# Patient Record
Sex: MALE | Race: WHITE | URBAN - METROPOLITAN AREA
[De-identification: names, ages, dates, MRNs, and addresses within clinical notes are randomized per-mention and may not be internally consistent; named-entity substitution may affect disease eponyms.]

---

## 2017-01-09 ENCOUNTER — OFFICE VISIT (OUTPATIENT)
Dept: SLEEP MEDICINE | Facility: CLINIC | Age: 52
End: 2017-01-09
Attending: INTERNAL MEDICINE
Payer: COMMERCIAL

## 2017-01-09 VITALS
OXYGEN SATURATION: 95 % | WEIGHT: 200 LBS | BODY MASS INDEX: 27.09 KG/M2 | HEART RATE: 70 BPM | HEIGHT: 72 IN | SYSTOLIC BLOOD PRESSURE: 136 MMHG | DIASTOLIC BLOOD PRESSURE: 80 MMHG

## 2017-01-09 DIAGNOSIS — G47.33 OBSTRUCTIVE SLEEP APNEA: Primary | ICD-10-CM

## 2017-01-09 PROCEDURE — 99211 OFF/OP EST MAY X REQ PHY/QHP: CPT | Mod: ZF

## 2017-01-09 NOTE — NURSING NOTE
Chief Complaint   Patient presents with     Consult     Inspire implant     Consult       Initial /80 mmHg  Pulse 70  Ht 1.829 m (6')  Wt 90.719 kg (200 lb)  BMI 27.12 kg/m2  SpO2 95% Estimated body mass index is 27.12 kg/(m^2) as calculated from the following:    Height as of this encounter: 1.829 m (6').    Weight as of this encounter: 90.719 kg (200 lb).  BP completed using cuff size: large  Right arm  Neck 42cm    MRoldan CMA

## 2017-01-10 NOTE — PROGRESS NOTES
Mr. Michael Garza is a 51-year-old gentleman with previously diagnosed severe obstructive sleep apnea based on a sleep study performed at [facility] with an apnea/hypopnea index of 55 and a prolonged period of CPAP intolerance.  He notes his sleep apnea associated with rather significant supine worsening.      As a result of CPAP intolerance he underwent nerve stimulator implant on  for hypoglossal nerve stimulation therapy.  The device was turned on today and we have counseled him on use of this device, advancement of the voltage and potential complications with tongue discomfort.  He will return in 1 month for final titration protocol using the device.      Assessment:  Severe obstructive sleep apnea with hypoxemia  Status-post right hypoglossal neurostimulator implant [UAS] 16    Plan:  UAS activated today with threshold for movement at 0.8 volts and adequate tongue protrusion at 0.9v  Voltage range set at 0.9 - 1.9 v  Increase voltage by 0.1 volt every day  30 minute delay  15 pause time  Use every day  Return  for final titration study    Total time 25 minutes, greater than 50% counseling.         BENEDICTO LESTER MD             D: 2017 16:03   T: 2017 20:38   MT: CT      Name:     MICHAEL GARZA   MRN:      [id number]        Account:      [id number]   :      1965           Visit Date:   2017      Document: [id number]       cc: Vivian Mills MD

## 2017-02-13 ENCOUNTER — THERAPY VISIT (OUTPATIENT)
Dept: SLEEP MEDICINE | Facility: CLINIC | Age: 52
End: 2017-02-13
Attending: INTERNAL MEDICINE
Payer: COMMERCIAL

## 2017-02-13 DIAGNOSIS — G47.33 OBSTRUCTIVE SLEEP APNEA: ICD-10-CM

## 2017-02-13 PROCEDURE — 95811 POLYSOM 6/>YRS CPAP 4/> PARM: CPT | Mod: ZF

## 2017-02-13 NOTE — MR AVS SNAPSHOT
After Visit Summary   2/13/2017    Pawel Degroot    MRN: [id number]           Patient Information     Date Of Birth          1965        Visit Information        Provider Department      2/13/2017 8:00 PM SLEEP STUDY RM 5 [facility], [place], Sleep Study        Care Instructions    [facility]    1. Your sleep study will be reviewed by a sleep physician within the next few days.     2. Please follow up in the sleep clinic as scheduled, or, make an appointment with your sleep provider to be seen within two weeks to discuss the results of the sleep study.    3. If you have any questions or problems with your treatment plan, please contact your sleep clinic provider at [phone] to further manage your condition.    4. Please review your attached medication list, and, at your follow-up appointment advise your sleep clinic provider about any changes.    5. Go to [email or web address] for more information about your sleep problems.    KAYE Brandon  February 14, 2017                Follow-ups after your visit        Your next 10 appointments already scheduled     Mar 23, 2017  8:30 AM CDT   (Arrive by 8:15 AM)   RETURN SLEEP ENT with Anna Lemon MD   [facility] ([facility] [[email or web address]] [facility] [place])    [address] [place] [id number] [phone]              Who to contact     If you have questions or need follow up information about today's clinic visit or your schedule please [facility] directly at [phone].  Normal or non-critical lab and imaging results will be communicated to you by MyChart, letter or phone within 4 business days after the clinic has received the results. If you do not hear from us within 7 days, please contact the clinic through MyChart or phone. If you have a critical or abnormal lab result, we will notify you by phone as soon as  "possible.  Submit refill requests through [employer] or call your pharmacy and they will forward the refill request to us. Please allow 3 business days for your refill to be completed.          Additional Information About Your Visit        [employer] Information     [employer] lets you send messages to your doctor, view your test results, renew your prescriptions, schedule appointments and more. To sign up, go to [email or web address] . Click on \"Log in\" on the left side of the screen, which will take you to the Welcome page. Then click on \"Sign up Now\" on the right side of the page.     You will be asked to enter the access code listed below, as well as some personal information. Please follow the directions to create your username and password.     Your access code is: [email or web address]  Expires: 3/6/2017  6:31 AM     Your access code will  in 90 days. If you need help or a new code, please call your [place] clinic or [phone].        Care EveryWhere ID     This is your Care EveryWhere ID. This could be used by other organizations to access your [place] medical records  [id number]         Blood Pressure from Last 3 Encounters:   17 136/80   16 134/88   16 127/76    Weight from Last 3 Encounters:   17 90.7 kg (200 lb)   16 89.8 kg (198 lb)   16 89.8 kg (197 lb 15.6 oz)              Today, you had the following     No orders found for display       Primary Care Provider Office Phone # Fax #    Julito Mills [phone]       [address] [address] [facility] [address] [facility] [address] [place] [address]        Thank you!     Thank you for choosing [facility], SLEEP STUDY  for your care. Our goal is always to provide you with excellent care. Hearing back from our patients is one way we can continue to improve our services. Please take a few minutes to complete the written survey that you may receive in the mail after your visit with us. Thank you!           "   Your Updated Medication List - Protect others around you: Learn how to safely use, store and throw away your medicines at www.disposemymeds.org.          This list is accurate as of: 2/13/17 11:59 PM.  Always use your most recent med list.                   Brand Name Dispense Instructions for use    ALBUTEROL IN      Inhale 1 puff into the lungs as needed

## 2017-02-13 NOTE — Clinical Note
Telephone call: Based on review of UAS titration study, patient advised to increase from current 1.2 volts by 0.1v each 3 days for 3 steps to 1.5 volts and communicate by [facility] for further instruction. If tolerated will move to 1.7 volts then home study.

## 2017-02-14 NOTE — PROGRESS NOTES
Completed a all night titration PSG per provider order.    Preliminary AHI 55.  A final therapeutic PAP pressure was achieved.    Supine REM was seen on therapeutic pressure.    Patient reports feeling refreshed in AM.

## 2017-02-14 NOTE — PATIENT INSTRUCTIONS
[place] SLEEP [facility]    1. Your sleep study will be reviewed by a sleep physician within the next few days.     2. Please follow up in the sleep clinic as scheduled, or, make an appointment with your sleep provider to be seen within two weeks to discuss the results of the sleep study.    3. If you have any questions or problems with your treatment plan, please contact your sleep clinic provider at [phone] to further manage your condition.    4. Please review your attached medication list, and, at your follow-up appointment advise your sleep clinic provider about any changes.    5. Go to [email or web address] for more information about your sleep problems.    Penny Carlos, DESHAUNGT  February 14, 2017

## 2017-02-14 NOTE — PROGRESS NOTES
Telephone call:  Based on review of UAS titration study, patient advised to increase from current 1.2 volts by 0.1v each 3 days for 3 steps to 1.5 volts and [facility].  If tolerated will move to 1.7 volts then home study.  Con Iber  HPI      ROS      Physical Exam

## 2017-02-16 NOTE — PROCEDURES
SLEEP STUDY INTERPRETATION  TITRATION STUDY      Patient: Pawel Degroot  Date of Birth: [birth year]  Study Date: 2/13/17  MRN: [id number]  Referring Provider: Julito Mills MD  Ordering Provider: Ryan Guzman MD    Indications for Polysomnography: The patient is a 51 y old male who is 6' and weighs 200.0 lbs.  His BMI is 27.4, Cascade sleepiness scale is 7.0 and neck size is 41cm.  Relevant medical history includes severe obstructive sleep apnea with supine worsening and CPAP intolerance who underwent hypoglossal neurostimulator implant 12/7/16 and subsequent activation 1/9/17 [threshold for movement at 0.8 volts and adequate tongue protrusion at 0.9v] with the following instructions:  UAS activated today with threshold for movement at 0.8 volts and adequate tongue protrusion at 0.9v    Voltage range set at 0.9 - 1.9 v    Increase voltage by 0.1 volt every day    30 minute delay    15 pause time    Use every day     A diagnostic polysomnogram UAS titration was performed to determine optimal settings.     Polysomnogram Data:  A full night polysomnogram recorded the standard physiologic parameters including EEG, EOG, EMG, ECG, nasal and oral airflow.  Respiratory parameters of chest and abdominal movements were recorded with respiratory inductance plethysmography.  Oxygen saturation was recorded by pulse oximetry.      Treatment PSG:  Sleep Architecture: Prolonged wakefulness and increased arousals during titration study.   The total recording time of the study was 444.8 minutes.  The total sleep time was 360.0 minutes.  Sleep latency was decreased at 6.1 minutes without the use of a sleep aid.  REM latency was 74.5 minutes.  Arousal index was increased at 25.0 arousals per hour.  Sleep efficiency was decreased at 80.9%.  Wake after sleep onset was 77.5 minutes.   The patient spent 6.4% of total sleep time in Stage N1, 59.2% in Stage N2, 9.9% in Stage N3 and 24.6% in REM.     Respiration: Effective control of sleep  apnea during supine stage R     The patient was titrated at voltages ranging from 0.5-1.7 volts..  The optimal voltage achieved was 1.5-1.7 volts with a residual AHI of 0.9 events per hour.  Time in REM supine on final pressure was 16  minutes.     Snoring - was reported as intermittent loud.     Respiratory rate and pattern - was notable for respiratory rate and pattern.    Sustained Sleep Associated Hypoventilation - Transcutaneous carbon dioxide monitoring was not used, however significant hypoventilation was not suggested by oximetry.    Sleep Associated Hypoxemia - (Greater than 5 minutes O2 sat below 89%) was not present.  Baseline oxygen saturation was 95.5%. Lowest oxygen saturation was 81.5%.  Time spent less than or equal to 88% was 0 minutes.         Movement Activity: No abnormal sleep behaviors.     Periodic Limb Activity - There were 20 PLMs during the entire study. The PLM index was 3.3 movements per hour.  The PLM Arousal Index was 1.2 per hour.    REM EMG Activity - Excessive transient / sustained muscle activity was not present.    Nocturnal Behavior - Abnormal sleep related behaviors were not noted.    Bruxism - None apparent.    Cardiac Summary: Normal sinus rhythm.   The average pulse rate was 65.6 bpm.  The minimum pulse rate was 51.9 bpm while the maximum pulse rate was 96.6 bpm. The rhythm is normal sinus. Arrhythmias were not noted.    Assessment:     Severe obstructive sleep apnea with supine worsening effectively treated with hypoglossal neurostimulator implant with limited time in supine stage R.      Recommendations:    Patient advised to increase from current 1.2 volts by 0.1v each 3 days for 3 steps to 1.5 volts and communicate by [facility] for further instruction.    If tolerated will increase to 1.7 volts then home sleep study to verify effectiveness in home environment.     Advise regarding the risks of drowsy driving.    Suggest optimizing sleep schedule and avoiding sleep  deprivation.    Pharmacologic therapy should be used for management of restless legs syndrome only if present and clinically indicated and not based on the presence of periodic limb movements alone.    Diagnostic Codes:      Obstructive Sleep Apnea G47.33    Repetitive Intrusions Into Sleep F51.8    Sleep Hypoxemia/Hypoventilation G47.36       _____________________________________   Ryan Guzman MD 2/16/17

## 2017-02-20 ENCOUNTER — TELEPHONE (OUTPATIENT)
Dept: SLEEP MEDICINE | Facility: CLINIC | Age: 52
End: 2017-02-20

## 2017-05-09 ENCOUNTER — TELEPHONE (OUTPATIENT)
Dept: SLEEP MEDICINE | Facility: CLINIC | Age: 52
End: 2017-05-09

## 2017-05-09 NOTE — TELEPHONE ENCOUNTER
Patient stated he is at a conference and wanted to talk next week. Patient will call back next week.

## 2017-05-23 ENCOUNTER — TELEPHONE (OUTPATIENT)
Dept: SLEEP MEDICINE | Facility: CLINIC | Age: 52
End: 2017-05-23

## 2017-05-23 NOTE — TELEPHONE ENCOUNTER
Patient needs to be scheduled for clinic follow up and HST.     Called patient home number, spoke with him. Once I stated who I was and where I was calling from, Rock stated he would have to call me back as he was in a meeting.     He took my call back number and I informed him my availability (office hours) for when he calls me back.     ROSIE Muller  Clinic Coordinator   Registered Medical Assistant   [facility]

## 2017-05-25 NOTE — TELEPHONE ENCOUNTER
Received VM from patient, He is in [place] and asked for return call.     Returned call to him at [place] number [phone]    Left VM asking for call back.     ROSIE Muller  Clinic Coordinator   Registered Medical Assistant   [facility]